# Patient Record
Sex: FEMALE | URBAN - METROPOLITAN AREA
[De-identification: names, ages, dates, MRNs, and addresses within clinical notes are randomized per-mention and may not be internally consistent; named-entity substitution may affect disease eponyms.]

---

## 2018-03-23 ENCOUNTER — APPOINTMENT (RX ONLY)
Dept: URBAN - METROPOLITAN AREA CLINIC 45 | Facility: CLINIC | Age: 33
Setting detail: DERMATOLOGY
End: 2018-03-23

## 2018-03-23 DIAGNOSIS — L21.8 OTHER SEBORRHEIC DERMATITIS: ICD-10-CM

## 2018-03-23 PROBLEM — J45.909 UNSPECIFIED ASTHMA, UNCOMPLICATED: Status: ACTIVE | Noted: 2018-03-23

## 2018-03-23 PROBLEM — L20.84 INTRINSIC (ALLERGIC) ECZEMA: Status: ACTIVE | Noted: 2018-03-23

## 2018-03-23 PROBLEM — L29.8 OTHER PRURITUS: Status: ACTIVE | Noted: 2018-03-23

## 2018-03-23 PROCEDURE — 99202 OFFICE O/P NEW SF 15 MIN: CPT

## 2018-03-23 PROCEDURE — ? COUNSELING

## 2018-03-23 PROCEDURE — ? PRESCRIPTION

## 2018-03-23 PROCEDURE — ? TREATMENT REGIMEN

## 2018-03-23 RX ORDER — KETOCONAZOLE 20.5 MG/ML
SHAMPOO, SUSPENSION TOPICAL TIW
Qty: 1 | Refills: 2 | Status: ERX | COMMUNITY
Start: 2018-03-23

## 2018-03-23 RX ORDER — FLUOCINOLONE ACETONIDE 0.11 MG/ML
OIL TOPICAL
Qty: 1 | Refills: 2 | Status: ERX | COMMUNITY
Start: 2018-03-23

## 2018-03-23 RX ADMIN — FLUOCINOLONE ACETONIDE: 0.11 OIL TOPICAL at 15:14

## 2018-03-23 RX ADMIN — KETOCONAZOLE: 20.5 SHAMPOO, SUSPENSION TOPICAL at 15:12

## 2018-03-23 ASSESSMENT — LOCATION SIMPLE DESCRIPTION DERM: LOCATION SIMPLE: ANTERIOR SCALP

## 2018-03-23 ASSESSMENT — LOCATION DETAILED DESCRIPTION DERM: LOCATION DETAILED: MID-FRONTAL SCALP

## 2018-03-23 ASSESSMENT — LOCATION ZONE DERM: LOCATION ZONE: SCALP

## 2018-03-23 NOTE — HPI: RASH
How Severe Is Your Rash?: severe
Is This A New Presentation, Or A Follow-Up?: Rash
Additional History: Has tried and failed otc treatments in the past.

## 2018-03-23 NOTE — PROCEDURE: TREATMENT REGIMEN
Initiate Treatment: Ketoconazole shampoo alternating with t sal and head and shoulders \\nDerma smooth oil nightly
Detail Level: Zone